# Patient Record
Sex: FEMALE | Race: WHITE | NOT HISPANIC OR LATINO | Employment: OTHER | URBAN - METROPOLITAN AREA
[De-identification: names, ages, dates, MRNs, and addresses within clinical notes are randomized per-mention and may not be internally consistent; named-entity substitution may affect disease eponyms.]

---

## 2017-04-07 DIAGNOSIS — E11.9 TYPE 2 DIABETES MELLITUS WITHOUT COMPLICATION: ICD-10-CM

## 2018-01-26 PROBLEM — F51.01 PRIMARY INSOMNIA: Status: ACTIVE | Noted: 2018-01-26

## 2018-01-26 PROBLEM — I25.10 CORONARY ARTERY DISEASE INVOLVING NATIVE HEART WITHOUT ANGINA PECTORIS: Status: ACTIVE | Noted: 2018-01-26

## 2018-01-26 PROBLEM — I10 ESSENTIAL HYPERTENSION: Status: ACTIVE | Noted: 2018-01-26

## 2018-01-26 PROBLEM — E89.0 POSTOPERATIVE HYPOTHYROIDISM: Status: ACTIVE | Noted: 2018-01-26

## 2018-01-26 PROBLEM — M54.81 OCCIPITAL NEURALGIA OF LEFT SIDE: Status: ACTIVE | Noted: 2018-01-26

## 2018-01-26 PROBLEM — E05.00 GRAVES DISEASE: Status: ACTIVE | Noted: 2018-01-26

## 2018-01-26 PROBLEM — M79.7 FIBROMYALGIA: Status: ACTIVE | Noted: 2018-01-26

## 2018-01-26 PROBLEM — E78.2 MIXED HYPERLIPIDEMIA: Status: ACTIVE | Noted: 2018-01-26

## 2018-08-09 PROBLEM — M62.838 MUSCLE SPASMS OF LOWER EXTREMITY: Status: ACTIVE | Noted: 2018-08-09

## 2018-08-09 PROBLEM — R42 DIZZINESS: Status: ACTIVE | Noted: 2018-08-09

## 2018-08-09 PROBLEM — H53.8 BLURRED VISION, BILATERAL: Status: ACTIVE | Noted: 2018-08-09

## 2018-08-09 PROBLEM — M54.50 ACUTE BILATERAL LOW BACK PAIN WITHOUT SCIATICA: Status: ACTIVE | Noted: 2018-08-09

## 2018-09-19 ENCOUNTER — TELEPHONE (OUTPATIENT)
Dept: RHEUMATOLOGY | Facility: CLINIC | Age: 63
End: 2018-09-19

## 2018-09-19 NOTE — TELEPHONE ENCOUNTER
----- Message from Rachelle Norwood sent at 9/19/2018 11:45 AM CDT -----  Type: Needs Medical Advice    Who Called:  Patient  Best Call Back Number: [phone]  Additional Information: Needs to schedule an appointment for her fibroid myalgia. Please call back to advise.

## 2018-10-08 PROBLEM — M19.041 PRIMARY OSTEOARTHRITIS OF BOTH HANDS: Status: ACTIVE | Noted: 2018-10-08

## 2018-10-08 PROBLEM — M19.042 PRIMARY OSTEOARTHRITIS OF BOTH HANDS: Status: ACTIVE | Noted: 2018-10-08

## 2019-05-04 PROBLEM — R41.82 ALTERED MENTAL STATUS: Status: ACTIVE | Noted: 2019-05-04

## 2019-05-04 PROBLEM — G92.9 ENCEPHALOPATHY, TOXIC: Status: ACTIVE | Noted: 2019-05-04

## 2019-05-05 PROBLEM — I95.1 ORTHOSTATIC HYPOTENSION: Status: ACTIVE | Noted: 2019-05-05

## 2019-10-21 PROBLEM — M50.90 CERVICAL DISC DISEASE: Status: ACTIVE | Noted: 2019-10-21

## 2020-07-20 PROBLEM — M47.818 SI JOINT ARTHRITIS: Status: ACTIVE | Noted: 2017-03-31

## 2020-11-09 PROBLEM — I88.9 CERVICAL ADENITIS: Status: ACTIVE | Noted: 2020-11-09

## 2020-12-21 ENCOUNTER — PATIENT MESSAGE (OUTPATIENT)
Dept: OTHER | Facility: OTHER | Age: 65
End: 2020-12-21

## 2022-02-23 DIAGNOSIS — D84.9 IMMUNOSUPPRESSED STATUS: ICD-10-CM
